# Patient Record
(demographics unavailable — no encounter records)

---

## 2024-10-22 NOTE — DISCUSSION/SUMMARY
[FreeTextEntry1] : 16 year F presenting for meningitis vaccine.  Tolerated well without issue.  Also due for repeat urine, udip with trace intact blood and small leuks, will send UA to confirm.  RTC routine and prn.  Caretaker expressed understanding and all questions answered. [] : The components of the vaccine(s) to be administered today are listed in the plan of care. The disease(s) for which the vaccine(s) are intended to prevent and the risks have been discussed with the caretaker.  The risks are also included in the appropriate vaccination information statements which have been provided to the patient's caregiver.  The caregiver has given consent to vaccinate.

## 2024-10-22 NOTE — HISTORY OF PRESENT ILLNESS
[Meningococcal ACWY] : Meningococcal ACWY [FreeTextEntry1] : Denies fever, known allergies to vaccination, reaction to vaccines in past.  Also due for repeat urine test as previous had some abnormalities

## 2025-02-10 NOTE — PHYSICAL EXAM
[NL] : warm, clear [Pain with manipulation of pinna] : no pain with manipulation of pinna [Inflammation of canal] : no inflammation of canal [FreeTextEntry3] : no displacement of pinna [de-identified] : small bump with tenderness over left mastoid, no tenderness to rest of SCM

## 2025-02-10 NOTE — HISTORY OF PRESENT ILLNESS
[de-identified] : sick [FreeTextEntry6] : Subjective:   - Summary: Jennifer presents with a painful bump behind her left ear that she noticed yesterday morning, causing discomfort   - Chief Complaint (CC): Left postauricular mass, painful on head movement to right, present for 1 day.   - History of Present Illness: Jennifer, a young female patient, presents with a chief complaint of a painful bump behind her left ear that she noticed yesterday morning. The mass is causing discomfort, particularly when she turns her head to the right. The patient reports difficulty sleeping due to the pain. Her mother examined the area and could feel the bump, although it was not visibly apparent. Jennifer denies any recent illness, fever, sore throat, ear pain, or runny nose. She mentions wearing glasses frequently and sometimes resting her head on that side, but denies any new piercings or recent trauma to the area. The pain is localized to the bump and slightly around it. Jennifer has attempted to alleviate the pain with Advil (2 pills) but found it ineffective.

## 2025-02-10 NOTE — HISTORY OF PRESENT ILLNESS
[de-identified] : sick [FreeTextEntry6] : Subjective:   - Summary: Jennifer presents with a painful bump behind her left ear that she noticed yesterday morning, causing discomfort   - Chief Complaint (CC): Left postauricular mass, painful on head movement to right, present for 1 day.   - History of Present Illness: Jennifer, a young female patient, presents with a chief complaint of a painful bump behind her left ear that she noticed yesterday morning. The mass is causing discomfort, particularly when she turns her head to the right. The patient reports difficulty sleeping due to the pain. Her mother examined the area and could feel the bump, although it was not visibly apparent. Jennifer denies any recent illness, fever, sore throat, ear pain, or runny nose. She mentions wearing glasses frequently and sometimes resting her head on that side, but denies any new piercings or recent trauma to the area. The pain is localized to the bump and slightly around it. Jennifer has attempted to alleviate the pain with Advil (2 pills) but found it ineffective.

## 2025-02-10 NOTE — DISCUSSION/SUMMARY
[FreeTextEntry1] : Small bump with tenderness over left mastoid Pain brought on by palpation and right neck rotation No ear findings, no fever.  No recent ear infections or uris.  Likely not mastoiditis but strict ED precautions given US of lump/area to evaluate for possible LAD Warm compress, analgesics - Return/ED precautions given.  RTC routine and prn.  Caretaker expressed understanding and all questions answered.

## 2025-02-10 NOTE — PHYSICAL EXAM
[NL] : warm, clear [Pain with manipulation of pinna] : no pain with manipulation of pinna [Inflammation of canal] : no inflammation of canal [FreeTextEntry3] : no displacement of pinna [de-identified] : small bump with tenderness over left mastoid, no tenderness to rest of SCM

## 2025-02-14 NOTE — HISTORY OF PRESENT ILLNESS
[Mother] : mother [Yes] : Patient goes to dentist yearly [Normal] : normal [LMP: _____] : LMP: [unfilled] [Eats meals with family] : eats meals with family [Has family members/adults to turn to for help] : has family members/adults to turn to for help [Is permitted and is able to make independent decisions] : Is permitted and is able to make independent decisions [Grade: ____] : Grade: [unfilled] [Eats regular meals including adequate fruits and vegetables] : eats regular meals including adequate fruits and vegetables [Drinks non-sweetened liquids] : drinks non-sweetened liquids  [Calcium source] : calcium source [Has friends] : has friends [Has interests/participates in community activities/volunteers] : has interests/participates in community activities/volunteers. [Uses safety belts/safety equipment] : uses safety belts/safety equipment  [Has peer relationships free of violence] : has peer relationships free of violence [No] : Patient has not had sexual intercourse. [HIV Screening Declined] : HIV Screening Declined [Has ways to cope with stress] : has ways to cope with stress [Displays self-confidence] : displays self-confidence [With Teen] : teen [NO] : No [Irregular menses] : no irregular menses [Sleep Concerns] : no sleep concerns [Has concerns about body or appearance] : does not have concerns about body or appearance [At least 1 hour of physical activity a day] : does not do at least 1 hour of physical activity a day [Screen time (except homework) less than 2 hours a day] : no screen time (except homework) less than 2 hours a day [Uses electronic nicotine delivery system] : does not use electronic nicotine delivery system [Uses tobacco] : does not use tobacco [Uses drugs] : does not use drugs  [Drinks alcohol] : does not drink alcohol [Impaired/distracted driving] : no impaired/distracted driving [Has problems with sleep] : does not have problems with sleep [Gets depressed, anxious, or irritable/has mood swings] : does not get depressed, anxious, or irritable/has mood swings [Has thought about hurting self or considered suicide] : has not thought about hurting self or considered suicide [FreeTextEntry7] : Iron deficiency anemia - not consistently taking iron, has not been taking recently [de-identified] : Brady, 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

## 2025-02-14 NOTE — HISTORY OF PRESENT ILLNESS
[Mother] : mother [Yes] : Patient goes to dentist yearly [Normal] : normal [LMP: _____] : LMP: [unfilled] [Eats meals with family] : eats meals with family [Has family members/adults to turn to for help] : has family members/adults to turn to for help [Is permitted and is able to make independent decisions] : Is permitted and is able to make independent decisions [Grade: ____] : Grade: [unfilled] [Eats regular meals including adequate fruits and vegetables] : eats regular meals including adequate fruits and vegetables [Drinks non-sweetened liquids] : drinks non-sweetened liquids  [Calcium source] : calcium source [Has friends] : has friends [Has interests/participates in community activities/volunteers] : has interests/participates in community activities/volunteers. [Uses safety belts/safety equipment] : uses safety belts/safety equipment  [Has peer relationships free of violence] : has peer relationships free of violence [No] : Patient has not had sexual intercourse. [HIV Screening Declined] : HIV Screening Declined [Has ways to cope with stress] : has ways to cope with stress [Displays self-confidence] : displays self-confidence [With Teen] : teen [NO] : No [Irregular menses] : no irregular menses [Sleep Concerns] : no sleep concerns [Has concerns about body or appearance] : does not have concerns about body or appearance [At least 1 hour of physical activity a day] : does not do at least 1 hour of physical activity a day [Screen time (except homework) less than 2 hours a day] : no screen time (except homework) less than 2 hours a day [Uses electronic nicotine delivery system] : does not use electronic nicotine delivery system [Uses tobacco] : does not use tobacco [Uses drugs] : does not use drugs  [Drinks alcohol] : does not drink alcohol [Impaired/distracted driving] : no impaired/distracted driving [Has problems with sleep] : does not have problems with sleep [Gets depressed, anxious, or irritable/has mood swings] : does not get depressed, anxious, or irritable/has mood swings [Has thought about hurting self or considered suicide] : has not thought about hurting self or considered suicide [FreeTextEntry7] : Iron deficiency anemia - not consistently taking iron, has not been taking recently [de-identified] : Brady, 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

## 2025-02-14 NOTE — DISCUSSION/SUMMARY
[Anticipatory Guidance Given] : Anticipatory guidance addressed as per the history of present illness section [Physical Growth and Development] : physical growth and development [Social and Academic Competence] : social and academic competence [Emotional Well-Being] : emotional well-being [Risk Reduction] : risk reduction [Violence and Injury Prevention] : violence and injury prevention [] : The components of the vaccine(s) to be administered today are listed in the plan of care. The disease(s) for which the vaccine(s) are intended to prevent and the risks have been discussed with the caretaker.  The risks are also included in the appropriate vaccination information statements which have been provided to the patient's caregiver.  The caregiver has given consent to vaccinate. [Adolescent demonstrates understanding of his/her conditions and how to take prescribed medications?] : Adolescent demonstrates understanding of his/her conditions and how to take prescribed medications? Yes [Adolescent asks questions during each office  visit and participates in the care plan?] : Adolescent asks questions during each office visit and participates in the care plan? Yes [FreeTextEntry1] : KERLINE is a 17 year F presenting for C. Growth and development appropriate. HEADSSS completed, with teen privately.   WCC - Anticipatory guidance and routine care provided - RTC for next WCC and prn - Screening: Vision, Color Test, Hearing - Labs: routine + SUZANNA - Immunizations:  flu - Referrals:  n/a   Caretaker expressed understanding of the plan and agrees. No other concerns or questions today.

## 2025-02-22 NOTE — HISTORY OF PRESENT ILLNESS
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

## 2025-02-22 NOTE — DISCUSSION/SUMMARY
[FreeTextEntry1] : Plan - Labs ordered - QuantiFERON, titers for Hep B - MMR - Varicella, Urine Drug Screen - Contingencies reviewed such as - if QuantiFERON is indeterminate or positive, will need to further investigate, including repeating QuantiFERON or completing PPD, as appropriate. If titers are indeterminate or negative, revaccination may be required  - Upon obtaining lab results, school paperwork can then be completed. Guardian will be notified of pickup  Continue supportive care. ED precautions reviewed. RTC routine and prn. Caretaker expressed understanding of the plan and agrees. No other concerns or questions today.

## 2025-02-22 NOTE — BEGINNING OF VISIT
[Home] : at home, [unfilled] , at the time of the visit. [Medical Office: ([facility])___] : at the medical office located in  [Telephone (audio)] : This telephonic visit was provided via audio only technology. [No access to tele-video equipment] : patient lacks access to tele-video equipment. [FreeTextEntry3] : Mother [Mother] : mother